# Patient Record
Sex: MALE | Race: WHITE | NOT HISPANIC OR LATINO | Employment: FULL TIME | URBAN - METROPOLITAN AREA
[De-identification: names, ages, dates, MRNs, and addresses within clinical notes are randomized per-mention and may not be internally consistent; named-entity substitution may affect disease eponyms.]

---

## 2020-12-15 ENCOUNTER — TELEPHONE (OUTPATIENT)
Dept: SCHEDULING | Facility: IMAGING CENTER | Age: 25
End: 2020-12-15

## 2020-12-22 ENCOUNTER — APPOINTMENT (OUTPATIENT)
Dept: MEDICAL GROUP | Facility: PHYSICIAN GROUP | Age: 25
End: 2020-12-22

## 2020-12-29 ENCOUNTER — OFFICE VISIT (OUTPATIENT)
Dept: MEDICAL GROUP | Facility: PHYSICIAN GROUP | Age: 25
End: 2020-12-29
Payer: COMMERCIAL

## 2020-12-29 VITALS
HEART RATE: 70 BPM | HEIGHT: 74 IN | DIASTOLIC BLOOD PRESSURE: 84 MMHG | WEIGHT: 150 LBS | OXYGEN SATURATION: 97 % | TEMPERATURE: 97 F | BODY MASS INDEX: 19.25 KG/M2 | SYSTOLIC BLOOD PRESSURE: 130 MMHG

## 2020-12-29 DIAGNOSIS — R10.30 LOWER ABDOMINAL PAIN: ICD-10-CM

## 2020-12-29 DIAGNOSIS — K58.2 IRRITABLE BOWEL SYNDROME WITH BOTH CONSTIPATION AND DIARRHEA: ICD-10-CM

## 2020-12-29 PROCEDURE — 99204 OFFICE O/P NEW MOD 45 MIN: CPT | Performed by: FAMILY MEDICINE

## 2020-12-29 ASSESSMENT — PATIENT HEALTH QUESTIONNAIRE - PHQ9
CLINICAL INTERPRETATION OF PHQ2 SCORE: 3
5. POOR APPETITE OR OVEREATING: 2 - MORE THAN HALF THE DAYS
SUM OF ALL RESPONSES TO PHQ QUESTIONS 1-9: 13

## 2020-12-29 ASSESSMENT — ENCOUNTER SYMPTOMS
ANOREXIA: 1
VOMITING: 0
CARDIOVASCULAR NEGATIVE: 1
HEMOPTYSIS: 0
TINGLING: 0
RESPIRATORY NEGATIVE: 1
FEVER: 0
MYALGIAS: 0
CHILLS: 0
CONSTIPATION: 0
COUGH: 0
DEPRESSION: 0
PSYCHIATRIC NEGATIVE: 1
NAUSEA: 1
DIZZINESS: 0
HEARTBURN: 0
PALPITATIONS: 0
NEUROLOGICAL NEGATIVE: 1
BRUISES/BLEEDS EASILY: 0
BELCHING: 0
ARTHRALGIAS: 0
DOUBLE VISION: 0
CONSTITUTIONAL NEGATIVE: 1
BLURRED VISION: 0
DIARRHEA: 1
MUSCULOSKELETAL NEGATIVE: 1
EYES NEGATIVE: 1
ABDOMINAL PAIN: 1
HEADACHES: 0

## 2020-12-30 NOTE — PROGRESS NOTES
Subjective:      Sandro Pierre is a 25 y.o. male who presents with Establish Care (Establish care. Requests a referral to GI. )            1. Irritable bowel syndrome with both constipation and diarrhea    - REFERRAL TO GASTROENTEROLOGY  - Comp Metabolic Panel; Future  - FREE THYROXINE; Future  - TRIIDOTHYRONINE; Future  - TESTOSTERONE SERUM; Future  - VITAMIN D,25 HYDROXY; Future  - TSH; Future  - CBC WITH DIFFERENTIAL; Future  - AMYLASE; Future  - LIPASE; Future  - H. PYLORI BREATH TEST    2. Lower abdominal pain    - Comp Metabolic Panel; Future  - FREE THYROXINE; Future  - TRIIDOTHYRONINE; Future  - TESTOSTERONE SERUM; Future  - VITAMIN D,25 HYDROXY; Future  - TSH; Future  - CBC WITH DIFFERENTIAL; Future  - AMYLASE; Future  - LIPASE; Future  - H. PYLORI BREATH TEST    Past Medical History:  No date: Hypogammaglobulinemia (HCC)  No date: Psychiatric disorder      Comment:  depression  History reviewed. No pertinent surgical history.  Social History    Tobacco Use      Smoking status: Light Tobacco Smoker      Smokeless tobacco: Never Used    Alcohol use: Yes      Comment: occasional    Drug use: Yes      Types: Marijuana, Inhaled    History reviewed.  No pertinent family history.      Current Outpatient Medications: •  Ferrous Sulfate (IRON) 28 MG Tab, Take  by mouth., Disp: , Rfl: •  escitalopram (LEXAPRO) 10 MG Tab, Take 10 mg by mouth every day., Disp: , Rfl:     Patient was instructed on the use of medications, either prescriptions or OTC and informed on when the appropriate follow up time period should be. In addition, patient was also instructed that should any acute worsening occur that they should notify this clinic asap or call 911.        Abdominal Pain  This is a chronic problem. The onset quality is undetermined. The problem occurs intermittently. The problem has been waxing and waning. The pain is located in the generalized abdominal region. The pain is moderate. The quality of the pain is cramping and  "colicky. The abdominal pain does not radiate. Associated symptoms include anorexia, diarrhea and nausea. Pertinent negatives include no arthralgias, belching, constipation, dysuria, fever, headaches, myalgias or vomiting. The pain is aggravated by eating. The pain is relieved by nothing. He has tried antacids for the symptoms. The treatment provided no relief.       Review of Systems   Constitutional: Negative.  Negative for chills and fever.   HENT: Negative.  Negative for hearing loss.    Eyes: Negative.  Negative for blurred vision and double vision.   Respiratory: Negative.  Negative for cough and hemoptysis.    Cardiovascular: Negative.  Negative for chest pain and palpitations.   Gastrointestinal: Positive for abdominal pain, anorexia, diarrhea and nausea. Negative for constipation, heartburn and vomiting.   Genitourinary: Negative.  Negative for dysuria.   Musculoskeletal: Negative.  Negative for arthralgias and myalgias.   Skin: Negative.  Negative for rash.   Neurological: Negative.  Negative for dizziness, tingling and headaches.   Endo/Heme/Allergies: Negative.  Does not bruise/bleed easily.   Psychiatric/Behavioral: Negative.  Negative for depression and suicidal ideas.   All other systems reviewed and are negative.         Objective:     /84   Pulse 70   Temp 36.1 °C (97 °F)   Ht 1.88 m (6' 2\")   Wt 68 kg (150 lb)   SpO2 97%   BMI 19.26 kg/m²      Physical Exam  Vitals signs and nursing note reviewed.   Constitutional:       General: He is not in acute distress.     Appearance: He is well-developed. He is not diaphoretic.   HENT:      Head: Normocephalic and atraumatic.      Right Ear: External ear normal.      Left Ear: External ear normal.      Nose: Nose normal.      Mouth/Throat:      Pharynx: No oropharyngeal exudate.   Eyes:      General: No scleral icterus.        Right eye: No discharge.         Left eye: No discharge.      Pupils: Pupils are equal, round, and reactive to light. "   Neck:      Musculoskeletal: Normal range of motion and neck supple.      Thyroid: No thyromegaly.      Vascular: No JVD.      Trachea: No tracheal deviation.   Cardiovascular:      Rate and Rhythm: Normal rate and regular rhythm.      Heart sounds: Normal heart sounds. No murmur. No friction rub. No gallop.    Pulmonary:      Effort: Pulmonary effort is normal. No respiratory distress.      Breath sounds: Normal breath sounds. No stridor. No wheezing or rales.   Chest:      Chest wall: No tenderness.   Abdominal:      General: There is no distension.      Palpations: Abdomen is soft.      Tenderness: There is no abdominal tenderness.   Lymphadenopathy:      Cervical: No cervical adenopathy.   Neurological:      Mental Status: He is alert and oriented to person, place, and time.      Cranial Nerves: No cranial nerve deficit.   Psychiatric:         Behavior: Behavior normal.         Thought Content: Thought content normal.         Judgment: Judgment normal.                 Assessment/Plan:        1. Irritable bowel syndrome with both constipation and diarrhea    - REFERRAL TO GASTROENTEROLOGY  - Comp Metabolic Panel; Future  - FREE THYROXINE; Future  - TRIIDOTHYRONINE; Future  - TESTOSTERONE SERUM; Future  - VITAMIN D,25 HYDROXY; Future  - TSH; Future  - CBC WITH DIFFERENTIAL; Future  - AMYLASE; Future  - LIPASE; Future  - H. PYLORI BREATH TEST    2. Lower abdominal pain    - Comp Metabolic Panel; Future  - FREE THYROXINE; Future  - TRIIDOTHYRONINE; Future  - TESTOSTERONE SERUM; Future  - VITAMIN D,25 HYDROXY; Future  - TSH; Future  - CBC WITH DIFFERENTIAL; Future  - AMYLASE; Future  - LIPASE; Future  - H. PYLORI BREATH TEST

## 2023-06-26 ENCOUNTER — APPOINTMENT (OUTPATIENT)
Dept: MEDICAL GROUP | Facility: LAB | Age: 28
End: 2023-06-26
Payer: COMMERCIAL

## 2023-06-27 SDOH — ECONOMIC STABILITY: HOUSING INSECURITY: IN THE LAST 12 MONTHS, HOW MANY PLACES HAVE YOU LIVED?: 2

## 2023-06-27 SDOH — ECONOMIC STABILITY: INCOME INSECURITY: IN THE LAST 12 MONTHS, WAS THERE A TIME WHEN YOU WERE NOT ABLE TO PAY THE MORTGAGE OR RENT ON TIME?: NO

## 2023-06-27 SDOH — ECONOMIC STABILITY: HOUSING INSECURITY
IN THE LAST 12 MONTHS, WAS THERE A TIME WHEN YOU DID NOT HAVE A STEADY PLACE TO SLEEP OR SLEPT IN A SHELTER (INCLUDING NOW)?: NO

## 2023-06-27 SDOH — ECONOMIC STABILITY: TRANSPORTATION INSECURITY
IN THE PAST 12 MONTHS, HAS THE LACK OF TRANSPORTATION KEPT YOU FROM MEDICAL APPOINTMENTS OR FROM GETTING MEDICATIONS?: NO

## 2023-06-27 SDOH — ECONOMIC STABILITY: FOOD INSECURITY: WITHIN THE PAST 12 MONTHS, YOU WORRIED THAT YOUR FOOD WOULD RUN OUT BEFORE YOU GOT MONEY TO BUY MORE.: SOMETIMES TRUE

## 2023-06-27 SDOH — ECONOMIC STABILITY: TRANSPORTATION INSECURITY
IN THE PAST 12 MONTHS, HAS LACK OF TRANSPORTATION KEPT YOU FROM MEETINGS, WORK, OR FROM GETTING THINGS NEEDED FOR DAILY LIVING?: NO

## 2023-06-27 SDOH — ECONOMIC STABILITY: TRANSPORTATION INSECURITY
IN THE PAST 12 MONTHS, HAS LACK OF RELIABLE TRANSPORTATION KEPT YOU FROM MEDICAL APPOINTMENTS, MEETINGS, WORK OR FROM GETTING THINGS NEEDED FOR DAILY LIVING?: NO

## 2023-06-27 SDOH — ECONOMIC STABILITY: FOOD INSECURITY: WITHIN THE PAST 12 MONTHS, THE FOOD YOU BOUGHT JUST DIDN'T LAST AND YOU DIDN'T HAVE MONEY TO GET MORE.: SOMETIMES TRUE

## 2023-06-27 SDOH — ECONOMIC STABILITY: INCOME INSECURITY: HOW HARD IS IT FOR YOU TO PAY FOR THE VERY BASICS LIKE FOOD, HOUSING, MEDICAL CARE, AND HEATING?: SOMEWHAT HARD

## 2023-06-27 SDOH — HEALTH STABILITY: PHYSICAL HEALTH: ON AVERAGE, HOW MANY MINUTES DO YOU ENGAGE IN EXERCISE AT THIS LEVEL?: 60 MIN

## 2023-06-27 SDOH — HEALTH STABILITY: PHYSICAL HEALTH: ON AVERAGE, HOW MANY DAYS PER WEEK DO YOU ENGAGE IN MODERATE TO STRENUOUS EXERCISE (LIKE A BRISK WALK)?: 5 DAYS

## 2023-06-27 SDOH — HEALTH STABILITY: MENTAL HEALTH
STRESS IS WHEN SOMEONE FEELS TENSE, NERVOUS, ANXIOUS, OR CAN'T SLEEP AT NIGHT BECAUSE THEIR MIND IS TROUBLED. HOW STRESSED ARE YOU?: NOT AT ALL

## 2023-06-27 ASSESSMENT — LIFESTYLE VARIABLES
HOW OFTEN DO YOU HAVE A DRINK CONTAINING ALCOHOL: 2-4 TIMES A MONTH
HOW OFTEN DO YOU HAVE SIX OR MORE DRINKS ON ONE OCCASION: LESS THAN MONTHLY

## 2023-06-27 ASSESSMENT — SOCIAL DETERMINANTS OF HEALTH (SDOH)
IN A TYPICAL WEEK, HOW MANY TIMES DO YOU TALK ON THE PHONE WITH FAMILY, FRIENDS, OR NEIGHBORS?: THREE TIMES A WEEK
HOW OFTEN DO YOU GET TOGETHER WITH FRIENDS OR RELATIVES?: ONCE A WEEK
HOW OFTEN DO YOU ATTEND CHURCH OR RELIGIOUS SERVICES?: 1 TO 4 TIMES PER YEAR
HOW OFTEN DO YOU GET TOGETHER WITH FRIENDS OR RELATIVES?: ONCE A WEEK
ARE YOU MARRIED, WIDOWED, DIVORCED, SEPARATED, NEVER MARRIED, OR LIVING WITH A PARTNER?: NEVER MARRIED
ARE YOU MARRIED, WIDOWED, DIVORCED, SEPARATED, NEVER MARRIED, OR LIVING WITH A PARTNER?: NEVER MARRIED
WITHIN THE PAST 12 MONTHS, YOU WORRIED THAT YOUR FOOD WOULD RUN OUT BEFORE YOU GOT THE MONEY TO BUY MORE: SOMETIMES TRUE
DO YOU BELONG TO ANY CLUBS OR ORGANIZATIONS SUCH AS CHURCH GROUPS UNIONS, FRATERNAL OR ATHLETIC GROUPS, OR SCHOOL GROUPS?: NO
HOW OFTEN DO YOU HAVE A DRINK CONTAINING ALCOHOL: 2-4 TIMES A MONTH
DO YOU BELONG TO ANY CLUBS OR ORGANIZATIONS SUCH AS CHURCH GROUPS UNIONS, FRATERNAL OR ATHLETIC GROUPS, OR SCHOOL GROUPS?: NO
HOW OFTEN DO YOU ATTENT MEETINGS OF THE CLUB OR ORGANIZATION YOU BELONG TO?: PATIENT DECLINED
HOW OFTEN DO YOU HAVE SIX OR MORE DRINKS ON ONE OCCASION: LESS THAN MONTHLY
HOW OFTEN DO YOU ATTENT MEETINGS OF THE CLUB OR ORGANIZATION YOU BELONG TO?: PATIENT DECLINED
IN A TYPICAL WEEK, HOW MANY TIMES DO YOU TALK ON THE PHONE WITH FAMILY, FRIENDS, OR NEIGHBORS?: THREE TIMES A WEEK
HOW HARD IS IT FOR YOU TO PAY FOR THE VERY BASICS LIKE FOOD, HOUSING, MEDICAL CARE, AND HEATING?: SOMEWHAT HARD
HOW OFTEN DO YOU ATTEND CHURCH OR RELIGIOUS SERVICES?: 1 TO 4 TIMES PER YEAR

## 2023-06-28 ENCOUNTER — OFFICE VISIT (OUTPATIENT)
Dept: MEDICAL GROUP | Facility: LAB | Age: 28
End: 2023-06-28
Payer: COMMERCIAL

## 2023-06-28 VITALS
RESPIRATION RATE: 16 BRPM | DIASTOLIC BLOOD PRESSURE: 74 MMHG | WEIGHT: 158 LBS | BODY MASS INDEX: 20.28 KG/M2 | HEIGHT: 74 IN | HEART RATE: 72 BPM | OXYGEN SATURATION: 98 % | TEMPERATURE: 97.5 F | SYSTOLIC BLOOD PRESSURE: 118 MMHG

## 2023-06-28 DIAGNOSIS — Z11.3 ROUTINE SCREENING FOR STI (SEXUALLY TRANSMITTED INFECTION): ICD-10-CM

## 2023-06-28 DIAGNOSIS — K58.2 IRRITABLE BOWEL SYNDROME WITH BOTH CONSTIPATION AND DIARRHEA: ICD-10-CM

## 2023-06-28 DIAGNOSIS — Z76.89 ENCOUNTER TO ESTABLISH CARE: ICD-10-CM

## 2023-06-28 DIAGNOSIS — K64.9 HEMORRHOIDS, UNSPECIFIED HEMORRHOID TYPE: ICD-10-CM

## 2023-06-28 DIAGNOSIS — G47.19 EXCESSIVE DAYTIME SLEEPINESS: ICD-10-CM

## 2023-06-28 DIAGNOSIS — R21 SKIN ERUPTION: ICD-10-CM

## 2023-06-28 DIAGNOSIS — Z13.220 LIPID SCREENING: ICD-10-CM

## 2023-06-28 DIAGNOSIS — Z13.29 SCREENING FOR THYROID DISORDER: ICD-10-CM

## 2023-06-28 PROCEDURE — 99214 OFFICE O/P EST MOD 30 MIN: CPT | Performed by: PHYSICIAN ASSISTANT

## 2023-06-28 PROCEDURE — 3074F SYST BP LT 130 MM HG: CPT | Performed by: PHYSICIAN ASSISTANT

## 2023-06-28 PROCEDURE — 3078F DIAST BP <80 MM HG: CPT | Performed by: PHYSICIAN ASSISTANT

## 2023-06-28 RX ORDER — CLOTRIMAZOLE AND BETAMETHASONE DIPROPIONATE 10; .64 MG/G; MG/G
1 CREAM TOPICAL 2 TIMES DAILY
Qty: 45 G | Refills: 0 | Status: SHIPPED | OUTPATIENT
Start: 2023-06-28

## 2023-06-28 RX ORDER — DEXTROAMPHETAMINE SACCHARATE, AMPHETAMINE ASPARTATE, DEXTROAMPHETAMINE SULFATE AND AMPHETAMINE SULFATE 5; 5; 5; 5 MG/1; MG/1; MG/1; MG/1
20 TABLET ORAL
COMMUNITY
Start: 2023-06-14

## 2023-06-28 NOTE — PROGRESS NOTES
Subjective:     CC:  Diagnoses of Encounter to establish care, Irritable bowel syndrome with both constipation and diarrhea, Hemorrhoids, unspecified hemorrhoid type, Excessive daytime sleepiness, Routine screening for STI (sexually transmitted infection), Lipid screening, Screening for thyroid disorder, and Skin eruption were pertinent to this visit.    HISTORY OF THE PRESENT ILLNESS: Patient is a 27 y.o. male. This pleasant patient is here today to establish care and discuss several concerns. His/her prior PCP was none.    Skin eruption  Rash on L elbow x 2 years  Recently started getting bigger over the past 2 months   Tried OTC topicals,  antifungals, currently applying lotion to the affected area with no improvement in symptoms    ADHD  On medication since childhood, stopped age 19  Resumed medication age 24   Currently following with psychiatry for management of this.  He is also established with a therapist    Sudden onset fatigue  Patient reports for the past 3 to 4 months he has dizzy issues with falling asleep when he is stationary including at work.  Patient reports he will often fall sleep in inappropriate scenarios as well is has noticed that he has been sleeping longer at night up to 11 to 13 hours, previously sleeping 9-7.  Patient notes that this excessive sleepiness has started affect his quality of life.  Denies episodes of suddenly falling asleep associate with strong emotions, anxiety, stress.  No previous sleep study.  No known history of sleep apnea    Weight loss/muscle mass loss  Going gym, has   Going 1 hour x 5 days per week, weight lifting  Taking protein supplements  Eating 3000 calories per day  Weighed approx 170, now 158 with minimal changes    Hemorrhoids  Chronic history of hemorrhoids with previous banding procedures.  Patient is requesting referral to GI for further follow-up and to explore additional surgical options    Prep  Patient is also interested in starting  "PrEP medication for HIV prophylaxis    History of Hypogammaglobulinemia   -in childhood  -now resolved/in remission    Health Maintenance     - Dyslipidemia (30-45): Ordered  - Diabetes (HTN, HLD, BMI >25): Ordered  Substance Use: Some marijuana use, working cutting down vaping  Tobacco Use/counseling: Negative       Infectious disease screening/Immunizations  --STI/HIV Screening: Ordered  --Practices safe sex.  --Hepatitis C Screening (18 to 80 yo): Ordered  --Immunizations: Due for second and third HPV vaccines    Current Outpatient Medications Ordered in Epic   Medication Sig Dispense Refill    amphetamine-dextroamphetamine (ADDERALL) 20 MG Tab Take 20 mg by mouth every day.      clotrimazole-betamethasone (LOTRISONE) 1-0.05 % Cream Apply 1 Application topically 2 times a day. 45 g 0     No current Epic-ordered facility-administered medications on file.       Health Maintenance: Completed    ROS:   Gen: no fevers/chills, no changes in weight  Eyes: no changes in vision  ENT: no sore throat, no hearing loss, no bloody nose  Pulm: no sob, no cough  CV: no chest pain, no palpitations  GI: no nausea/vomiting, no diarrhea  : no dysuria  MSk: no myalgias  Skin: no rash  Neuro: no headaches, no numbness/tingling  Heme/Lymph: no easy bruising      Objective:       Exam: /74   Pulse 72   Temp 36.4 °C (97.5 °F)   Resp 16   Ht 1.88 m (6' 2\")   Wt 71.7 kg (158 lb)   SpO2 98%  Body mass index is 20.29 kg/m².    General: Normal appearing. No distress.  HEENT: Normocephalic. Eyes conjunctiva clear lids without ptosis, pupils equal and reactive to light accommodation, ears normal shape and contour, canals are clear bilaterally, tympanic membranes are benign, nasal mucosa benign, oropharynx is without erythema, edema or exudates.   Neck: Supple without JVD or bruit. Thyroid is not enlarged.  Pulmonary: Clear to ausculation.  Normal effort. No rales, ronchi, or wheezing.  Cardiovascular: Regular rate and rhythm " without murmur. Carotid and radial pulses are intact and equal bilaterally.  Abdomen: Soft, nontender, nondistended. Normal bowel sounds. Liver and spleen are not palpable  Neurologic: Grossly nonfocal  Lymph: No cervical or supraclavicular lymph nodes are palpable  Skin: Warm and dry.  Approximately 3.5 cm diameter lesion on the posterior aspect of the left elbow with very slight superficial flaking as well as slightly raised slightly red margin.  No satellite lesions.  No ulceration  Musculoskeletal: Normal gait. No extremity cyanosis, clubbing, or edema.  Psych: Normal mood and affect. Alert and oriented x3. Judgment and insight is normal.      Assessment & Plan:   27 y.o. male with the following -    1. Encounter to establish care  Labs per orders  Vaccinations per orders  Screenings per orders      2. Irritable bowel syndrome with both constipation and diarrhea  - Referral to Gastroenterology    3. Hemorrhoids, unspecified hemorrhoid type  - Referral to Gastroenterology    4. Excessive daytime sleepiness  Uncertain etiology although patient does report excessive daytime sleepiness possibly consistent with sleep apnea.  Sleep study and labs ordered for further evaluation  - TSH WITH REFLEX TO FT4; Future  - HEMOGLOBIN A1C; Future  - Testosterone, Free & Total, Adult Male (w/SHBG); Future  - Polysomnography, 4 or More; Future  - Referral to Pulmonary and Sleep Medicine    5. Routine screening for STI (sexually transmitted infection)  Start PrEP if STI/STD screening is negative  - Chlamydia/GC, PCR (Urine); Future  - HEP C VIRUS ANTIBODY; Future  - T.PALLIDUM AB EJ (SCREENING); Future  - HIV AG/AB COMBO ASSAY SCREENING; Future    6. Lipid screening  - CBC WITH DIFFERENTIAL; Future  - Comp Metabolic Panel; Future  - Lipid Profile; Future    7. Screening for thyroid disorder  - TSH WITH REFLEX TO FT4; Future    8. Skin eruption  - clotrimazole-betamethasone (LOTRISONE) 1-0.05 % Cream; Apply 1 Application topically  2 times a day.  Dispense: 45 g; Refill: 0      I spent a total of 30 minutes with record review, exam, communication with the patient, communication with other providers, and documentation of this encounter.    Return for After labs.    Please note that this dictation was created using voice recognition software. I have made every reasonable attempt to correct obvious errors, but I expect that there are errors of grammar and possibly content that I did not discover before finalizing the note.

## 2023-07-10 ENCOUNTER — TELEPHONE (OUTPATIENT)
Dept: HEALTH INFORMATION MANAGEMENT | Facility: OTHER | Age: 28
End: 2023-07-10
Payer: COMMERCIAL

## 2023-07-11 ENCOUNTER — TELEPHONE (OUTPATIENT)
Dept: HEALTH INFORMATION MANAGEMENT | Facility: OTHER | Age: 28
End: 2023-07-11
Payer: COMMERCIAL

## 2023-07-11 NOTE — TELEPHONE ENCOUNTER
Spoke with patient re:  My Chart note regarding black stools, nausea and intermittent dizziness for 5 days.    Pt. States since My Chart notes, his stools have been brown with small black flecks.  He only has rare nausea and occasional light headedness.  He has been going to work as normal.    Offered a PCP appointment for 7/12/23 which he declined.  He stated he does have an pritesh't. With GI Specialist 7/28/23 (per discussion SHAINA Rooney).    Decision:  pt. Stated he will maintain GI referral pritesh't. And not accept a PCP pritesh't.  Discussed acute S/S of bloody stool, nausea, vomiting and dizziness that would need immediate intervention.  Pt. Stated if they develop he will go to Urgent Care or the ED.

## 2023-07-15 ENCOUNTER — HOSPITAL ENCOUNTER (OUTPATIENT)
Dept: LAB | Facility: MEDICAL CENTER | Age: 28
End: 2023-07-15
Attending: PHYSICIAN ASSISTANT
Payer: COMMERCIAL

## 2023-07-15 DIAGNOSIS — Z11.3 ROUTINE SCREENING FOR STI (SEXUALLY TRANSMITTED INFECTION): ICD-10-CM

## 2023-07-15 DIAGNOSIS — Z13.220 LIPID SCREENING: ICD-10-CM

## 2023-07-15 DIAGNOSIS — G47.19 EXCESSIVE DAYTIME SLEEPINESS: ICD-10-CM

## 2023-07-15 LAB
ALBUMIN SERPL BCP-MCNC: 4.3 G/DL (ref 3.2–4.9)
ALBUMIN/GLOB SERPL: 2.2 G/DL
ALP SERPL-CCNC: 31 U/L (ref 30–99)
ALT SERPL-CCNC: 15 U/L (ref 2–50)
ANION GAP SERPL CALC-SCNC: 7 MMOL/L (ref 7–16)
AST SERPL-CCNC: 11 U/L (ref 12–45)
BASOPHILS # BLD AUTO: 0.6 % (ref 0–1.8)
BASOPHILS # BLD: 0.03 K/UL (ref 0–0.12)
BILIRUB SERPL-MCNC: 0.5 MG/DL (ref 0.1–1.5)
BUN SERPL-MCNC: 9 MG/DL (ref 8–22)
CALCIUM ALBUM COR SERPL-MCNC: 8.6 MG/DL (ref 8.5–10.5)
CALCIUM SERPL-MCNC: 8.8 MG/DL (ref 8.5–10.5)
CHLORIDE SERPL-SCNC: 108 MMOL/L (ref 96–112)
CHOLEST SERPL-MCNC: 147 MG/DL (ref 100–199)
CO2 SERPL-SCNC: 25 MMOL/L (ref 20–33)
CREAT SERPL-MCNC: 0.96 MG/DL (ref 0.5–1.4)
EOSINOPHIL # BLD AUTO: 0.03 K/UL (ref 0–0.51)
EOSINOPHIL NFR BLD: 0.6 % (ref 0–6.9)
ERYTHROCYTE [DISTWIDTH] IN BLOOD BY AUTOMATED COUNT: 44 FL (ref 35.9–50)
EST. AVERAGE GLUCOSE BLD GHB EST-MCNC: 85 MG/DL
GFR SERPLBLD CREATININE-BSD FMLA CKD-EPI: 111 ML/MIN/1.73 M 2
GLOBULIN SER CALC-MCNC: 2 G/DL (ref 1.9–3.5)
GLUCOSE SERPL-MCNC: 85 MG/DL (ref 65–99)
HBA1C MFR BLD: 4.6 % (ref 4–5.6)
HCT VFR BLD AUTO: 34.4 % (ref 42–52)
HCV AB SER QL: NORMAL
HDLC SERPL-MCNC: 42 MG/DL
HGB BLD-MCNC: 11.5 G/DL (ref 14–18)
HIV 1+2 AB+HIV1 P24 AG SERPL QL IA: NORMAL
IMM GRANULOCYTES # BLD AUTO: 0.01 K/UL (ref 0–0.11)
IMM GRANULOCYTES NFR BLD AUTO: 0.2 % (ref 0–0.9)
LDLC SERPL CALC-MCNC: 93 MG/DL
LYMPHOCYTES # BLD AUTO: 1.17 K/UL (ref 1–4.8)
LYMPHOCYTES NFR BLD: 22 % (ref 22–41)
MCH RBC QN AUTO: 29.9 PG (ref 27–33)
MCHC RBC AUTO-ENTMCNC: 33.4 G/DL (ref 32.3–36.5)
MCV RBC AUTO: 89.6 FL (ref 81.4–97.8)
MONOCYTES # BLD AUTO: 0.45 K/UL (ref 0–0.85)
MONOCYTES NFR BLD AUTO: 8.5 % (ref 0–13.4)
NEUTROPHILS # BLD AUTO: 3.62 K/UL (ref 1.82–7.42)
NEUTROPHILS NFR BLD: 68.1 % (ref 44–72)
NRBC # BLD AUTO: 0 K/UL
NRBC BLD-RTO: 0 /100 WBC (ref 0–0.2)
PLATELET # BLD AUTO: 204 K/UL (ref 164–446)
PMV BLD AUTO: 11.5 FL (ref 9–12.9)
POTASSIUM SERPL-SCNC: 4.3 MMOL/L (ref 3.6–5.5)
PROT SERPL-MCNC: 6.3 G/DL (ref 6–8.2)
RBC # BLD AUTO: 3.84 M/UL (ref 4.7–6.1)
SODIUM SERPL-SCNC: 140 MMOL/L (ref 135–145)
T PALLIDUM AB SER QL IA: NORMAL
TRIGL SERPL-MCNC: 61 MG/DL (ref 0–149)
TSH SERPL DL<=0.005 MIU/L-ACNC: 1.44 UIU/ML (ref 0.38–5.33)
WBC # BLD AUTO: 5.3 K/UL (ref 4.8–10.8)

## 2023-07-15 PROCEDURE — 85025 COMPLETE CBC W/AUTO DIFF WBC: CPT

## 2023-07-15 PROCEDURE — 84270 ASSAY OF SEX HORMONE GLOBUL: CPT

## 2023-07-15 PROCEDURE — 80061 LIPID PANEL: CPT

## 2023-07-15 PROCEDURE — 36415 COLL VENOUS BLD VENIPUNCTURE: CPT

## 2023-07-15 PROCEDURE — 80053 COMPREHEN METABOLIC PANEL: CPT

## 2023-07-15 PROCEDURE — 83036 HEMOGLOBIN GLYCOSYLATED A1C: CPT

## 2023-07-15 PROCEDURE — 84402 ASSAY OF FREE TESTOSTERONE: CPT

## 2023-07-15 PROCEDURE — 87491 CHLMYD TRACH DNA AMP PROBE: CPT

## 2023-07-15 PROCEDURE — 84403 ASSAY OF TOTAL TESTOSTERONE: CPT

## 2023-07-15 PROCEDURE — 86780 TREPONEMA PALLIDUM: CPT

## 2023-07-15 PROCEDURE — 86803 HEPATITIS C AB TEST: CPT

## 2023-07-15 PROCEDURE — 84443 ASSAY THYROID STIM HORMONE: CPT

## 2023-07-15 PROCEDURE — 87591 N.GONORRHOEAE DNA AMP PROB: CPT

## 2023-07-15 PROCEDURE — 87389 HIV-1 AG W/HIV-1&-2 AB AG IA: CPT

## 2023-07-16 LAB
C TRACH DNA SPEC QL NAA+PROBE: NEGATIVE
N GONORRHOEA DNA SPEC QL NAA+PROBE: NEGATIVE
SPECIMEN SOURCE: NORMAL

## 2023-07-17 LAB
SHBG SERPL-SCNC: 53 NMOL/L (ref 17–56)
TESTOST FREE MFR SERPL: 1.5 % (ref 1.6–2.9)
TESTOST FREE SERPL-MCNC: 107 PG/ML (ref 47–244)
TESTOST SERPL-MCNC: 714 NG/DL (ref 300–1080)

## 2023-07-18 ENCOUNTER — SLEEP STUDY (OUTPATIENT)
Dept: SLEEP MEDICINE | Facility: MEDICAL CENTER | Age: 28
End: 2023-07-18
Attending: PHYSICIAN ASSISTANT
Payer: COMMERCIAL

## 2023-07-18 DIAGNOSIS — G47.19 EXCESSIVE DAYTIME SLEEPINESS: ICD-10-CM

## 2023-07-18 PROCEDURE — 95811 POLYSOM 6/>YRS CPAP 4/> PARM: CPT | Performed by: STUDENT IN AN ORGANIZED HEALTH CARE EDUCATION/TRAINING PROGRAM

## 2023-07-19 NOTE — PROCEDURES
Patient: Sandro Pierre  ID: [id number] Date: 7/18/2023   MONTAGE: Standard  STUDY TYPE: Split Night  RECORDING TECHNIQUE:   After the scalp was prepared, gold plated electrodes were applied to the scalp according to the International 10-20 System. EEG (electroencephalogram) was continuously monitored from the O1-M2, O2-M1, C3-M2, C4-M1, F3-M2, and F4-M1. EOGs (electrooculograms) were monitored by electrodes placed at the left and right outer canthi. Chin EMG (electromyogram) was monitored by electrodes placed on the mentalis and sub-mentalis muscles. Nasal and oral airflow were monitored using a triple port thermocouple as well as oronasal pressure transducer. Respiratory effort was measured by inductive plethysmography technology employing abdominal and thoracic belts. Blood oxygen saturation and pulse were monitored by pulse oximetry. Heart rhythm was monitored by surface electrocardiogram. Leg EMG was studied using surface electrodes placed on left and right anterior tibialis. A microphone was used to monitor tracheal sounds and snoring. Body position was monitored and documented by technician observation.   SCORING CRITERIA:   A modification of the AASM manual for scoring of sleep and associated events was used. Obstructive apneas were scored by cessation of airflow for at least 10 seconds with continuing respiratory effort. Central apneas were scored by cessation of airflow for at least 10 seconds with no respiratory effort. Hypopneas were scored by a 30% or more reduction in airflow for at least 10 seconds accompanied by arterial oxygen desaturation of 3% or an arousal. For CMS (Medicare) patients, per AASM rule 1B, hypopneas are scored by 30% with mild reduction in airflow for at least 10 seconds accompanied by arterial saturation decreased at 4%.  DIAGNOSTIC  Study start time was 09:01:36 PM. Diagnostic recording time was 262 minutes with a total sleep time of 185 minutes resulting in a sleep efficiency of  70.61%%. Sleep latency from the start of the study was 62 minutes and the latency from sleep to REM was 69 minutes. In total,49 arousals were scored for an arousal index of 15.9.  Respiratory:  There were a total of 16 apneas consisting of 5 obstructive apneas, 0 mixed apneas, and 11 central apneas. A total of 38 hypopneas were scored. The apnea index was 5.19 per hour and the hypopnea index was 12.32 per hour resulting in an overall AHI of 17.51. AHI during REM was 22.2 and AHI while supine was 10.00.  Central apneas accounted for 20% of respiratory events  Oximetry:  There was a mean oxygen saturation of 93.0%. The minimum oxygen saturation during NREM sleep was 86.0% and in REM was 84.0%. Time spent during sleep with oxygen saturations <88% was 7.7 minutes.   Cardiac:  The highest heart rate seen while awake was 81 BPM while the highest heart rate during sleep was 88 BPM with an average sleeping heart rate of 48 BPM.  Limb Movements:  There were a total of 21 PLMs during sleep, which resulted in a PLM index of 6.8. There were 10 PLMs associated with arousals which resulted in a PLMS arousal index of 3.2.  TREATMENT:  Treatment recording time was 3h 43.5m (223 minutes) with a total sleep time of 2h 46.5m (166 minutes) resulting in a sleep efficiency of 74.5%. Sleep latency from the start of treatment was 13 minutes and REM latency from sleep onset was 1h 47.0m. The patient had 125 arousals in total for an arousal index of 45.0.  Respiratory:   There were 110 apneas in total consisting of 0 obstructive apneas, 110 central apneas, and 0 mixed apneas for an apnea index of 39.64. The patient had 40 hypopneas in total, which resulted in a hypopnea index of 14.41. The overall AHI was 54.05, with a REM AHI of 35.12, and a supine AHI of 70.91.  Central apneas accounted for 73.3% of respiratory events.  Oximetry:  The mean SaO2 during treatment was 93.0%. The minimum oxygen saturation in NREM was 80.0 % and in REM was  83.0%. Patient spent 12.0 minutes of TST with SaO2 <88%.  Cardiac:  The highest heart rate during sleep was 81 BPM with an average sleeping heart rate of 47BPM.  Limb Movements:  There were a total of 0 PLMS during titration sleep time that resulted in an index of 0.0. There were 14 PLMS associated with arousals. This resulted in a PLM arousal index of 5.0.  Titration:  CPAP was tried from 5 to 9cm H2O. BiPAP was tried from 11/7 to 14/8cm H2O. BiPAP S/T was tried from 14/9 to 17/11cm H2O with a BR of 14.  This was a fully attended sleep study. This test was technically adequate. This patient was titrated on CPAP starting at 5 cm of water pressure. Patient was titrated up to BiPAP 14/8 cm of water pressure and BiPAP ST 17/11 BR 14.     Impression:  1.  Moderate obstructive sleep apnea with an overall AHI of 17.5 events an hour  2.  Mild nocturnal hypoxia likely secondary to untreated sleep apnea, time at or below 88% saturation 7.7 minutes during diagnostic portion  3.  Met criteria for split-night protocol and was started on CPAP, transitioned to BiPAP, and BiPAP ST.  4.  Met criteria for treatment emergent central sleep apnea (Complex Sleep Apnea) with an overall AHI of 54 events an hour during treatment portion and central apneas accounting for 73% of respiratory events    Recommendations:  I recommend the patient return for ASV titration due to the severity of complex sleep apnea.     In some cases alternative treatment options may be proven effective in resolving sleep apnea. These options include upper airway surgery, the use of a dental orthotic, weight loss, or positional therapy. Clinical correlation is required. In general patients with sleep apnea are advised to avoid alcohol, sedatives and not to operate a motor vehicle while drowsy.  Untreated sleep apnea increases the risk for cardiovascular and neurovascular disease.

## 2023-07-21 ENCOUNTER — OFFICE VISIT (OUTPATIENT)
Dept: MEDICAL GROUP | Facility: LAB | Age: 28
End: 2023-07-21
Payer: COMMERCIAL

## 2023-07-21 VITALS
SYSTOLIC BLOOD PRESSURE: 104 MMHG | BODY MASS INDEX: 19.64 KG/M2 | RESPIRATION RATE: 16 BRPM | HEART RATE: 60 BPM | OXYGEN SATURATION: 98 % | HEIGHT: 74 IN | WEIGHT: 153 LBS | DIASTOLIC BLOOD PRESSURE: 70 MMHG | TEMPERATURE: 97.5 F

## 2023-07-21 DIAGNOSIS — D50.9 IRON DEFICIENCY ANEMIA, UNSPECIFIED IRON DEFICIENCY ANEMIA TYPE: ICD-10-CM

## 2023-07-21 DIAGNOSIS — Z11.3 ROUTINE SCREENING FOR STI (SEXUALLY TRANSMITTED INFECTION): ICD-10-CM

## 2023-07-21 DIAGNOSIS — K92.1 BLACK TARRY STOOLS: ICD-10-CM

## 2023-07-21 DIAGNOSIS — Z20.9 EXPOSURE TO COMMUNICABLE DISEASE: ICD-10-CM

## 2023-07-21 PROCEDURE — 3074F SYST BP LT 130 MM HG: CPT | Performed by: PHYSICIAN ASSISTANT

## 2023-07-21 PROCEDURE — 99214 OFFICE O/P EST MOD 30 MIN: CPT | Performed by: PHYSICIAN ASSISTANT

## 2023-07-21 PROCEDURE — 3078F DIAST BP <80 MM HG: CPT | Performed by: PHYSICIAN ASSISTANT

## 2023-07-21 RX ORDER — EMTRICITABINE AND TENOFOVIR DISOPROXIL FUMARATE 200; 300 MG/1; MG/1
1 TABLET, FILM COATED ORAL DAILY
Qty: 90 TABLET | Refills: 0 | Status: SHIPPED | OUTPATIENT
Start: 2023-07-21

## 2023-07-21 RX ORDER — OMEPRAZOLE 20 MG/1
20 CAPSULE, DELAYED RELEASE ORAL 2 TIMES DAILY
Qty: 60 CAPSULE | Refills: 0 | Status: SHIPPED | OUTPATIENT
Start: 2023-07-21 | End: 2023-08-24

## 2023-07-21 ASSESSMENT — FIBROSIS 4 INDEX: FIB4 SCORE: 0.38

## 2023-07-21 NOTE — PROGRESS NOTES
"Subjective:     CC: f/u labs    HPI:   Sandro here today with     Anemia  New finding on labs. Pt reports chronic hx of hemorrhoids but relates recent hx of oily black diarrhea with associated lightheadedness, dizziness occurring approximately July 5.  Patient was instructed to present to ER for evaluation but declined citing cost.  Denies recent dark or bloody bowel movements.  Of note, he is currently taking an iron supplement.  Denies abdominal pain at this time. Prev referred to GI - appt in 2 months.  Patient had a similar episode in 2021 with subsequent colonoscopy which was significant for polyposis.  Several biopsies were taken although biopsy results are not available.    PReP  Sexually active MSM.  Recently completed STI screening which was negative.  He would like to initiate PrEP for HIV prophylaxis/prevention.  Up-to-date on hep B, hep a vaccines    ROS:  ROS negative symptoms indicated above    Current Outpatient Medications Ordered in Epic   Medication Sig Dispense Refill    omeprazole (PRILOSEC) 20 MG delayed-release capsule Take 1 Capsule by mouth 2 times a day. 60 Capsule 0    emtricitabine-tenofovir (TRUVADA) 200-300 MG per tablet Take 1 Tablet by mouth every day. 90 Tablet 0    amphetamine-dextroamphetamine (ADDERALL) 20 MG Tab Take 20 mg by mouth every day.      clotrimazole-betamethasone (LOTRISONE) 1-0.05 % Cream Apply 1 Application topically 2 times a day. 45 g 0     No current Epic-ordered facility-administered medications on file.         Objective:     Exam:  /70   Pulse 60   Temp 36.4 °C (97.5 °F)   Resp 16   Ht 1.88 m (6' 2\")   Wt 69.4 kg (153 lb)   SpO2 98%   BMI 19.64 kg/m²  Body mass index is 19.64 kg/m².    Constitutional: Alert, no distress, well-groomed.  Skin: Warm, dry, good turgor, no rashes in visible areas.  Eye: Equal, round and reactive, conjunctiva clear, lids normal.  ENMT: Lips without lesions, good dentition, moist mucous membranes.  Neck: Trachea midline, no " masses, no thyromegaly.  Respiratory: Unlabored respiratory effort, no cough.  MSK: Normal gait, moves all extremities.  Neuro: Grossly non-focal.   Psych: Alert and oriented x3, normal affect and mood.      Assessment & Plan:     28 y.o. male with the following -     1. Black tarry stools  Uncertain etiology although this appears to be recurrent issue occurring approximately 2021.  Unfortunately previous biopsy results are not available.  Patient has previously been referred to GI I would also like to get an updated colonoscopy given the polyp history  - Referral to GI for Colonoscopy    2. Iron deficiency anemia, unspecified iron deficiency anemia type  New problem  Likely related to above  Continue iron supplementation  - IRON/TOTAL IRON BIND; Future  - FERRITIN; Future  - VIT B12,  FOLIC ACID  - omeprazole (PRILOSEC) 20 MG delayed-release capsule; Take 1 Capsule by mouth 2 times a day.  Dispense: 60 Capsule; Refill: 0  - H. PYLORI BREATH TEST    3. Exposure to communicable disease  4. Routine screening for STI (sexually transmitted infection)  Discussed risk and benefits of medication.  Patient would like to proceed with Truvada.  Discussed safe sexual practices and need for frequent STI screening  - emtricitabine-tenofovir (TRUVADA) 200-300 MG per tablet; Take 1 Tablet by mouth every day.  Dispense: 90 Tablet; Refill: 0  - Chlamydia/GC, PCR (Urine); Standing  - HEP C VIRUS ANTIBODY; Standing  - T.PALLIDUM AB EJ (SCREENING); Standing  - HIV AG/AB COMBO ASSAY SCREENING; Standing  - Comp Metabolic Panel; Standing        I spent a total of 26 minutes with record review, exam, communication with the patient, communication with other providers, and documentation of this encounter.      Return in about 3 months (around 10/21/2023).    Please note that this dictation was created using voice recognition software. I have made every reasonable attempt to correct obvious errors, but there may be errors of grammar and  possibly content that I did not discover before finalizing the note.

## 2023-07-28 DIAGNOSIS — G47.31 COMPLEX SLEEP APNEA SYNDROME: ICD-10-CM

## 2023-08-14 ENCOUNTER — TELEPHONE (OUTPATIENT)
Dept: HEALTH INFORMATION MANAGEMENT | Facility: OTHER | Age: 28
End: 2023-08-14
Payer: COMMERCIAL

## 2023-10-24 ENCOUNTER — APPOINTMENT (OUTPATIENT)
Dept: MEDICAL GROUP | Facility: LAB | Age: 28
End: 2023-10-24
Payer: COMMERCIAL

## 2023-12-08 ENCOUNTER — APPOINTMENT (OUTPATIENT)
Dept: SLEEP MEDICINE | Facility: MEDICAL CENTER | Age: 28
End: 2023-12-08
Attending: PHYSICIAN ASSISTANT
Payer: COMMERCIAL

## 2023-12-11 ENCOUNTER — OFFICE VISIT (OUTPATIENT)
Dept: MEDICAL GROUP | Facility: LAB | Age: 28
End: 2023-12-11
Payer: COMMERCIAL

## 2023-12-11 VITALS
HEART RATE: 63 BPM | BODY MASS INDEX: 20.03 KG/M2 | SYSTOLIC BLOOD PRESSURE: 116 MMHG | RESPIRATION RATE: 16 BRPM | HEIGHT: 74 IN | WEIGHT: 156.09 LBS | DIASTOLIC BLOOD PRESSURE: 60 MMHG | OXYGEN SATURATION: 99 % | TEMPERATURE: 97 F

## 2023-12-11 DIAGNOSIS — D50.9 IRON DEFICIENCY ANEMIA, UNSPECIFIED IRON DEFICIENCY ANEMIA TYPE: ICD-10-CM

## 2023-12-11 DIAGNOSIS — Z23 NEED FOR VACCINATION: ICD-10-CM

## 2023-12-11 DIAGNOSIS — Z20.9 EXPOSURE TO COMMUNICABLE DISEASE: ICD-10-CM

## 2023-12-11 DIAGNOSIS — Z12.83 SKIN CANCER SCREENING: ICD-10-CM

## 2023-12-11 DIAGNOSIS — G47.31 COMPLEX SLEEP APNEA SYNDROME: ICD-10-CM

## 2023-12-11 DIAGNOSIS — K58.2 IRRITABLE BOWEL SYNDROME WITH BOTH CONSTIPATION AND DIARRHEA: ICD-10-CM

## 2023-12-11 PROCEDURE — 3078F DIAST BP <80 MM HG: CPT | Performed by: PHYSICIAN ASSISTANT

## 2023-12-11 PROCEDURE — 90677 PCV20 VACCINE IM: CPT | Performed by: PHYSICIAN ASSISTANT

## 2023-12-11 PROCEDURE — 90471 IMMUNIZATION ADMIN: CPT | Performed by: PHYSICIAN ASSISTANT

## 2023-12-11 PROCEDURE — 90686 IIV4 VACC NO PRSV 0.5 ML IM: CPT | Performed by: PHYSICIAN ASSISTANT

## 2023-12-11 PROCEDURE — 90472 IMMUNIZATION ADMIN EACH ADD: CPT | Performed by: PHYSICIAN ASSISTANT

## 2023-12-11 PROCEDURE — 99214 OFFICE O/P EST MOD 30 MIN: CPT | Mod: 25 | Performed by: PHYSICIAN ASSISTANT

## 2023-12-11 PROCEDURE — 3074F SYST BP LT 130 MM HG: CPT | Performed by: PHYSICIAN ASSISTANT

## 2023-12-11 ASSESSMENT — PATIENT HEALTH QUESTIONNAIRE - PHQ9: CLINICAL INTERPRETATION OF PHQ2 SCORE: 0

## 2023-12-11 ASSESSMENT — FIBROSIS 4 INDEX: FIB4 SCORE: 0.38

## 2023-12-11 NOTE — PROGRESS NOTES
cc:  hiv prevention    Subjective:     Sandro Pierre is a 28 y.o. male presenting to discuss HIV prevention medication.   He is high risk of HIV acquisition due to sexually active MSM:      Last HIV test: 07/2023, ordered  Last BMP (eGFR over 30): 07/2023, ordered  Last syphilis test: 07/2023, ordered  Last gonorrhea, chlamydia test: 07/2023, ordered  Hepatitis A and B vaccines: up to date    Any known exposure to HIV or other STIs:  No  Any drug use:  No  Missed doses of medication:  Yes    Dark, tarry stools  -Still having issues with bloody hemorrhoids but denies subsequent episodes of dark or tarry stools  -was able to make appt [facility], appt next week   -minimal improvement in omeprazole   -still having some stool inconsistency    Complex sleep apnea  Previously diagnosed on sleep study, patient does require ASV titration study which has been ordered today.  Patient does have appointment with sleep medicine 12/28/2023    Family history of melanoma  -Patient's mother was recently diagnosed with localized melanoma.  He does report history significant for sun exposure/sunburns.  Requesting referral to dermatology today for cancer screening    Denies any sore throat, fevers, fatigue, weight loss, flu-like symptoms, diarrhea, genital lesions, skin lesions    Review of systems:  See above.        Current Outpatient Medications:     emtricitabine-tenofovir (TRUVADA) 200-300 MG per tablet, Take 1 Tablet by mouth every day., Disp: 90 Tablet, Rfl: 0    amphetamine-dextroamphetamine (ADDERALL) 20 MG Tab, Take 20 mg by mouth every day., Disp: , Rfl:     omeprazole (PRILOSEC) 20 MG delayed-release capsule, TAKE 1 CAPSULE BY MOUTH TWICE A DAY (Patient not taking: Reported on 12/11/2023), Disp: 60 Capsule, Rfl: 11    clotrimazole-betamethasone (LOTRISONE) 1-0.05 % Cream, Apply 1 Application topically 2 times a day. (Patient not taking: Reported on 12/11/2023), Disp: 45 g, Rfl: 0    No Known  "Allergies    Allergies, past medical history, past surgical history, family history, social history reviewed and updated    Objective:     Vitals: /60 (BP Location: Left arm, Patient Position: Sitting, BP Cuff Size: Adult)   Pulse 63   Temp 36.1 °C (97 °F) (Temporal)   Resp 16   Ht 1.88 m (6' 2\")   Wt 70.8 kg (156 lb 1.4 oz)   SpO2 99%   BMI 20.04 kg/m²   Constitutional: Alert, no distress, well-groomed.  Skin: Warm, dry, good turgor, no rashes in visible areas.  Eye: Equal, round and reactive, conjunctiva clear, lids normal.  ENMT: Lips without lesions, good dentition, moist mucous membranes.  Neck: Trachea midline, no masses, no thyromegaly.  Respiratory: Unlabored respiratory effort, no cough.  MSK: Normal gait, moves all extremities.  Neuro: Grossly non-focal.   Psych: Alert and oriented x3, normal affect and mood.      Assessment/Plan:     1. Exposure to communicable disease  Counseled on safe sex practices, medication adherence, STI/HIV symptoms.  3 month supply will be filled on completion of labs. Standing lab orders available    2. Complex sleep apnea syndrome  ASV titration study ordered.  Continue follow-up with sleep medicine as scheduled  - Polysomnography Titration; Future    3. Irritable bowel syndrome with both constipation and diarrhea  Continue follow-up with GI as scheduled    4. Iron deficiency anemia, unspecified iron deficiency anemia type  Due for updated CBC for monitoring  - CBC WITH DIFFERENTIAL; Future    5. Skin cancer screening  -Emphasize importance of sun block, sun avoidance and sun safety  - Referral to Dermatology    6. Need for vaccination  - Influenza Vaccine Quad Injection (PF)  - Pneumococcal Conjugate Vaccine 20-Valent (6 wks+)              Return in about 3 months (around 3/11/2024).      "

## 2023-12-19 ENCOUNTER — APPOINTMENT (OUTPATIENT)
Dept: MEDICAL GROUP | Facility: LAB | Age: 28
End: 2023-12-19
Payer: COMMERCIAL

## 2023-12-28 ENCOUNTER — APPOINTMENT (OUTPATIENT)
Dept: SLEEP MEDICINE | Facility: MEDICAL CENTER | Age: 28
End: 2023-12-28
Attending: PHYSICIAN ASSISTANT
Payer: COMMERCIAL

## 2024-01-08 ENCOUNTER — TELEPHONE (OUTPATIENT)
Dept: HEALTH INFORMATION MANAGEMENT | Facility: OTHER | Age: 29
End: 2024-01-08
Payer: COMMERCIAL

## 2024-03-29 ENCOUNTER — HOSPITAL ENCOUNTER (OUTPATIENT)
Dept: RADIOLOGY | Facility: MEDICAL CENTER | Age: 29
End: 2024-03-29
Attending: INTERNAL MEDICINE
Payer: COMMERCIAL

## 2024-03-29 DIAGNOSIS — D50.9 IRON DEFICIENCY ANEMIA, UNSPECIFIED IRON DEFICIENCY ANEMIA TYPE: ICD-10-CM

## 2024-03-29 DIAGNOSIS — K92.1 MELENA: ICD-10-CM

## 2024-03-29 PROCEDURE — A9512 TC99M PERTECHNETATE: HCPCS
